# Patient Record
Sex: MALE | Race: WHITE | NOT HISPANIC OR LATINO | URBAN - NONMETROPOLITAN AREA
[De-identification: names, ages, dates, MRNs, and addresses within clinical notes are randomized per-mention and may not be internally consistent; named-entity substitution may affect disease eponyms.]

---

## 2021-03-09 ENCOUNTER — IMPORTED ENCOUNTER (OUTPATIENT)
Dept: URBAN - NONMETROPOLITAN AREA CLINIC 1 | Facility: CLINIC | Age: 56
End: 2021-03-09

## 2021-03-09 PROBLEM — H52.4: Noted: 2021-03-09

## 2021-03-09 PROBLEM — E11.9: Noted: 2021-03-09

## 2021-03-09 PROBLEM — H25.813: Noted: 2021-03-09

## 2021-03-09 PROCEDURE — S0620 ROUTINE OPHTHALMOLOGICAL EXA: HCPCS

## 2021-03-09 NOTE — PATIENT DISCUSSION
Hyperopia / Presbyopia OU - Discussed diagnosis in detail with patient- New Glass[facility] RX given today- DMV form filled out today - Continue to monitor- RTC 1 year complete NIDDM (2018)- Discussed diagnosis in detail with patient- Stressed importance of good blood sugar control- Recommend no soda’s- Patient reports last A1C unknown and last blood sugar was 125- No diabetic retinopathy seen on todays exam- Patient unsure of doctors name - Continue to monitorCataracts OU- Discussed diagnosis in detail with patient- Discussed signs and symptoms of progression- Discussed UV protection- No treatment needed at this time - Continue to monitor

## 2022-03-21 ENCOUNTER — ESTABLISHED PATIENT (OUTPATIENT)
Dept: URBAN - NONMETROPOLITAN AREA CLINIC 1 | Facility: CLINIC | Age: 57
End: 2022-03-21

## 2022-03-21 DIAGNOSIS — H52.4: ICD-10-CM

## 2022-03-21 PROCEDURE — 92015 DETERMINE REFRACTIVE STATE: CPT

## 2022-03-21 PROCEDURE — 92014 COMPRE OPH EXAM EST PT 1/>: CPT

## 2022-03-21 ASSESSMENT — VISUAL ACUITY
OS_CC: 20/25+2
OD_CC: 20/20+2

## 2022-03-21 ASSESSMENT — TONOMETRY
OS_IOP_MMHG: 19
OD_IOP_MMHG: 19

## 2022-04-09 ASSESSMENT — VISUAL ACUITY
OS_PH: 20/20
OD_CC: 20/50
OS_CC: 20/50-1
OD_PH: 20/25-

## 2022-04-09 ASSESSMENT — TONOMETRY
OS_IOP_MMHG: 20
OD_IOP_MMHG: 18

## 2025-01-27 ENCOUNTER — COMPREHENSIVE EXAM (OUTPATIENT)
Age: 60
End: 2025-01-27

## 2025-01-27 DIAGNOSIS — H25.813: ICD-10-CM

## 2025-01-27 DIAGNOSIS — E11.9: ICD-10-CM

## 2025-01-27 PROCEDURE — 99213 OFFICE O/P EST LOW 20 MIN: CPT

## 2025-04-16 ENCOUNTER — CONSULTATION/EVALUATION (OUTPATIENT)
Age: 60
End: 2025-04-16

## 2025-04-16 DIAGNOSIS — E11.9: ICD-10-CM

## 2025-04-16 DIAGNOSIS — H25.813: ICD-10-CM

## 2025-04-16 PROCEDURE — 92134 CPTRZ OPH DX IMG PST SGM RTA: CPT | Mod: NC

## 2025-04-16 PROCEDURE — 99214 OFFICE O/P EST MOD 30 MIN: CPT

## 2025-04-16 PROCEDURE — 92136 OPHTHALMIC BIOMETRY: CPT

## 2025-04-16 PROCEDURE — 92025 CPTRIZED CORNEAL TOPOGRAPHY: CPT | Mod: NC

## 2025-05-15 ENCOUNTER — PRE-OP/H&P (OUTPATIENT)
Age: 60
End: 2025-05-15

## 2025-06-04 ENCOUNTER — CONSULTATION/EVALUATION (OUTPATIENT)
Age: 60
End: 2025-06-04

## 2025-06-04 DIAGNOSIS — H40.023: ICD-10-CM

## 2025-06-04 DIAGNOSIS — H25.813: ICD-10-CM

## 2025-06-04 PROCEDURE — 92134 CPTRZ OPH DX IMG PST SGM RTA: CPT | Mod: NC

## 2025-06-04 PROCEDURE — 99214 OFFICE O/P EST MOD 30 MIN: CPT

## 2025-06-04 PROCEDURE — 92136 OPHTHALMIC BIOMETRY: CPT

## 2025-06-04 PROCEDURE — 92025 CPTRIZED CORNEAL TOPOGRAPHY: CPT | Mod: NC

## 2025-07-29 ENCOUNTER — PRE-OP/H&P (OUTPATIENT)
Age: 60
End: 2025-07-29

## 2025-07-29 VITALS
DIASTOLIC BLOOD PRESSURE: 78 MMHG | HEIGHT: 71 IN | HEART RATE: 76 BPM | SYSTOLIC BLOOD PRESSURE: 132 MMHG | WEIGHT: 200 LBS | BODY MASS INDEX: 28 KG/M2

## 2025-07-29 DIAGNOSIS — E78.2: ICD-10-CM

## 2025-07-29 DIAGNOSIS — I25.2: ICD-10-CM

## 2025-07-29 DIAGNOSIS — I10: ICD-10-CM

## 2025-07-29 DIAGNOSIS — I25.9: ICD-10-CM

## 2025-07-29 DIAGNOSIS — Z01.818: ICD-10-CM

## 2025-07-29 DIAGNOSIS — E11.9: ICD-10-CM

## 2025-07-29 PROCEDURE — 99213 OFFICE O/P EST LOW 20 MIN: CPT

## 2025-08-05 ENCOUNTER — SURGERY/PROCEDURE (OUTPATIENT)
Age: 60
End: 2025-08-05

## 2025-08-05 DIAGNOSIS — H25.812: ICD-10-CM

## 2025-08-05 PROCEDURE — 66984 XCAPSL CTRC RMVL W/O ECP: CPT

## 2025-08-07 ENCOUNTER — POST OP/EVAL OF SECOND EYE (OUTPATIENT)
Age: 60
End: 2025-08-07

## 2025-08-07 ENCOUNTER — DUPLICATE ENCOUNTER (OUTPATIENT)
Age: 60
End: 2025-08-07

## 2025-08-07 DIAGNOSIS — Z98.42: ICD-10-CM

## 2025-08-07 DIAGNOSIS — H25.811: ICD-10-CM

## 2025-08-07 PROCEDURE — 99024 POSTOP FOLLOW-UP VISIT: CPT

## 2025-08-07 RX ORDER — SODIUM CHLORIDE 50 MG/ML: 1 SOLUTION OPHTHALMIC

## 2025-08-12 ENCOUNTER — SURGERY/PROCEDURE (OUTPATIENT)
Age: 60
End: 2025-08-12

## 2025-08-12 DIAGNOSIS — H25.811: ICD-10-CM

## 2025-08-12 PROCEDURE — 66984 XCAPSL CTRC RMVL W/O ECP: CPT | Mod: 79,RT

## 2025-08-13 ENCOUNTER — POST OP/EVAL OF SECOND EYE (OUTPATIENT)
Age: 60
End: 2025-08-13

## 2025-08-13 DIAGNOSIS — Z98.42: ICD-10-CM

## 2025-08-13 DIAGNOSIS — Z98.41: ICD-10-CM

## 2025-08-13 PROCEDURE — 99024 POSTOP FOLLOW-UP VISIT: CPT

## 2025-08-20 ENCOUNTER — POST-OP (OUTPATIENT)
Age: 60
End: 2025-08-20

## 2025-08-20 DIAGNOSIS — Z98.41: ICD-10-CM

## 2025-08-20 DIAGNOSIS — Z98.42: ICD-10-CM

## 2025-08-20 PROCEDURE — 99024 POSTOP FOLLOW-UP VISIT: CPT
